# Patient Record
Sex: FEMALE | Race: WHITE | NOT HISPANIC OR LATINO | Employment: FULL TIME | URBAN - METROPOLITAN AREA
[De-identification: names, ages, dates, MRNs, and addresses within clinical notes are randomized per-mention and may not be internally consistent; named-entity substitution may affect disease eponyms.]

---

## 2017-01-11 ENCOUNTER — OFFICE VISIT (OUTPATIENT)
Dept: OBSTETRICS AND GYNECOLOGY | Facility: CLINIC | Age: 22
End: 2017-01-11

## 2017-01-11 VITALS
HEIGHT: 65 IN | WEIGHT: 250.8 LBS | DIASTOLIC BLOOD PRESSURE: 70 MMHG | HEART RATE: 70 BPM | SYSTOLIC BLOOD PRESSURE: 116 MMHG | BODY MASS INDEX: 41.79 KG/M2

## 2017-01-11 DIAGNOSIS — N97.0 INFERTILITY, ANOVULATION: Primary | ICD-10-CM

## 2017-01-11 PROCEDURE — 99213 OFFICE O/P EST LOW 20 MIN: CPT | Performed by: OBSTETRICS & GYNECOLOGY

## 2017-01-11 RX ORDER — ESTRADIOL 2 MG/1
TABLET ORAL
Qty: 24 TABLET | Refills: 0 | Status: SHIPPED | OUTPATIENT
Start: 2017-01-11 | End: 2017-01-11 | Stop reason: SDUPTHER

## 2017-01-11 RX ORDER — ESTRADIOL 2 MG/1
TABLET ORAL
Qty: 24 TABLET | Refills: 0 | Status: SHIPPED | OUTPATIENT
Start: 2017-01-11 | End: 2017-06-30

## 2017-01-11 RX ORDER — MEDROXYPROGESTERONE ACETATE 10 MG/1
TABLET ORAL
Qty: 14 TABLET | Refills: 0 | Status: SHIPPED | OUTPATIENT
Start: 2017-01-11 | End: 2017-01-11 | Stop reason: SDUPTHER

## 2017-01-11 RX ORDER — MEDROXYPROGESTERONE ACETATE 10 MG/1
TABLET ORAL
Qty: 14 TABLET | Refills: 0 | Status: SHIPPED | OUTPATIENT
Start: 2017-01-11 | End: 2017-03-14 | Stop reason: SDUPTHER

## 2017-01-11 NOTE — PROGRESS NOTES
Subjective   Maria Elena Jasso is a 21 y.o. female    CC: F/u for fertility    History of Present Illness  Pt would like to discuss what else she can do to try to conceive. Had a withdrawal flow from estrogen/progestin challenge a few months ago.  Now amenorrheic again.  Wants to begin the clomid that we had discussed before.    The following portions of the patient's history were reviewed and updated as appropriate: allergies, current medications, past family history, past medical history, past social history, past surgical history and problem list.    Review of Systems   Gastrointestinal: Negative for abdominal pain.   Genitourinary: Positive for menstrual problem. Negative for pelvic pain, vaginal bleeding and vaginal pain.       History reviewed. No pertinent past medical history.  History reviewed. No pertinent past surgical history.  OB History      Para Term  AB TAB SAB Ectopic Multiple Living    0 0 0 0 0 0 0 0 0 0        Menstrual History:  OB History      Para Term  AB TAB SAB Ectopic Multiple Living    0 0 0 0 0 0 0 0 0 0           Patient's last menstrual period was 2016.       Family History   Problem Relation Age of Onset   • Breast cancer Maternal Grandmother      History   Smoking Status   • Never Smoker   Smokeless Tobacco   • Not on file     History   Alcohol Use No     Comment: very rare       Objective   Physical Exam   Constitutional: She is oriented to person, place, and time. She appears well-developed and well-nourished.   Neurological: She is alert and oriented to person, place, and time.   Psychiatric: She has a normal mood and affect. Her behavior is normal. Judgment and thought content normal.   Nursing note and vitals reviewed.        Assessment/Plan   Diagnoses and all orders for this visit:    Infertility, anovulation : will re-induce withdrawal flow, then she will call and we'll begin a 50mg clomid cycle.  Discussed in detail and instruction for usage  given and written down by her.  Noted benefits/risks of usage.  Will resume PNV's now.    Other orders  -     Discontinue: estradiol (ESTRACE) 2 MG tablet; One daily X 24 days  -     Discontinue: medroxyPROGESTERone (PROVERA) 10 MG tablet; One daily with the last 14 days of her estradiol 2mg  -     estradiol (ESTRACE) 2 MG tablet; One daily X 24 days  -     medroxyPROGESTERone (PROVERA) 10 MG tablet; One daily with the last 14 days of her estradiol 2mg        Will call at onset of menses for Clomid rx.

## 2017-01-11 NOTE — MR AVS SNAPSHOT
Maria Elenabrie Jasso   2017 11:00 AM   Office Visit    Dept Phone:  [phone]   Encounter #:  [id number]    Provider:  Emmanuel Preston MD   Department:  [facility]                Your Full Care Plan              Today's Medication Changes          These changes are accurate as of: 17 11:03 AM.  If you have any questions, ask your nurse or doctor.               Stop taking medication(s)listed here:     estradiol 2 MG tablet   Commonly known as:  ESTRACE   Stopped by:  Emmanuel Preston MD           medroxyPROGESTERone 10 MG tablet   Commonly known as:  PROVERA   Stopped by:  Emmanuel Preston MD                      Your Updated Medication List      Notice  As of 2017 11:03 AM    You have not been prescribed any medications.            Instructions     None    Patient Instructions History      Upcoming Appointments     Visit Type Date Time Department    GYN FOLLOW UP 2017 11:00 AM [facility]      [employer] Signup     [facility] [employer] allows you to send messages to your doctor, view your test results, renew your prescriptions, schedule appointments, and more. To sign up, go to [employer] and click on the Sign Up Now link in the New User? box. Enter your [employer] Activation Code exactly as it appears below along with the last four digits of your Social Security Number and your Date of Birth () to complete the sign-up process. If you do not sign up before the expiration date, you must request a new code.    [employer] Activation Code: [email or web address]  Expires: 2017 11:03 AM    If you have questions, you can email [email or web address] or call [phone] to talk to our [employer] staff. Remember, [employer] is NOT to be used for urgent needs. For medical emergencies, dial 911.               Other Info from Your Visit           Allergies     No Known Allergies      Vital Signs     Blood Pressure Pulse  "Height Weight Last Menstrual Period Body Mass Index    116/70 (74 %/ 72 %)* 70 64.5\" (163.8 cm) (53 %, Z= 0.08)† 250 lb 12.8 oz (114 kg) (>99 %, Z= 2.51)† 11/17/2016 42.38 kg/m2 (99 %, Z= 2.23)†    Smoking Status                   Never Smoker         *BP percentiles are based on NHBPEP's 4th Report    †Growth percentiles are based on CDC 2-20 Years data.        "

## 2017-02-08 ENCOUNTER — TELEPHONE (OUTPATIENT)
Dept: OBSTETRICS AND GYNECOLOGY | Facility: CLINIC | Age: 22
End: 2017-02-08

## 2017-02-08 NOTE — TELEPHONE ENCOUNTER
----- Message from Cynthia Reyer sent at 2/7/2017  4:22 PM EST -----  Pt started her period today and is asking if you can call in the clomid to her pharmacy. RX # in chart, call back # [phone]    Sent in #5 of 50mg clomiphene to take one daily days 5-9 of cycle.

## 2017-02-13 ENCOUNTER — TELEPHONE (OUTPATIENT)
Dept: OBSTETRICS AND GYNECOLOGY | Facility: CLINIC | Age: 22
End: 2017-02-13

## 2017-02-13 NOTE — TELEPHONE ENCOUNTER
----- Message from Virginia Stein MA sent at 2/13/2017  1:29 PM EST -----  Pts insurance does not cover Clomid so she just paid cash for it. She did however have a question as to when she should start testing for ovulation.   ----- Message -----     From: Emmanuel Preston MD     Sent: 2/13/2017   8:38 AM       To: Virginia Stein MA    I never saw a request; have you?  ----- Message -----     From: Marcos Rouse     Sent: 2/10/2017   4:02 PM       To: Emmanuel Preston MD    Pt called about medication refill for clomid (which needs a PA).She stated the the pharmacy sent over a request for a PA and that she was told a PA was going to be requested. She would like to know if the PA has been sent in correctly because the insurance company has not received it. Pt is out of progesterone and estrogen  will need another rx for the medication.     Thank you-    MARCOS SOLARES to begin checking about day 14.  If she starts her flow on about day 28 or so, it means she ovulated, but just did not get pregnant that cycle and she needs to let us know.  If she does not start, wait about 2 wks and check preg test.  If pos, call and schedule OE visit.  If neg, she just did not ovulate with that dose; let us know, and we may need to give some med to get flow started and then may need to increase clomiphene dose.

## 2017-03-14 ENCOUNTER — TELEPHONE (OUTPATIENT)
Dept: OBSTETRICS AND GYNECOLOGY | Facility: CLINIC | Age: 22
End: 2017-03-14

## 2017-03-14 RX ORDER — MEDROXYPROGESTERONE ACETATE 5 MG/1
5 TABLET ORAL DAILY
Qty: 14 TABLET | Refills: 0 | Status: SHIPPED | OUTPATIENT
Start: 2017-03-14 | End: 2017-06-30

## 2017-03-14 NOTE — TELEPHONE ENCOUNTER
----- Message from Michelle Fletcher sent at 3/14/2017  1:32 PM EDT -----  Contact: pt  Patient called stating she did a round of Clomid this past month but this month she her pregnancy test is negative and she didn't start her cycle for this month. Please advise. Pt # is [phone] Pharmacy is in chart.    Sent her in #14 of 5mg provera to take one daily to start her flow.  Let us know when she begins and we'll increase our dose of Clomid for the next cycle.

## 2017-03-30 ENCOUNTER — TELEPHONE (OUTPATIENT)
Dept: OBSTETRICS AND GYNECOLOGY | Facility: CLINIC | Age: 22
End: 2017-03-30

## 2017-03-30 NOTE — TELEPHONE ENCOUNTER
----- Message from Cynthia Reyer sent at 3/28/2017  4:34 PM EDT -----  Pt is calling to let you know she started her period today so you can prescribe her more clomid. I won't be in the office tomorrow 3/29/17 so can you please forward the message to someone else? Thank you. Call back # [phone]    Called in.

## 2017-03-31 ENCOUNTER — TELEPHONE (OUTPATIENT)
Dept: OBSTETRICS AND GYNECOLOGY | Facility: CLINIC | Age: 22
End: 2017-03-31

## 2017-04-15 ENCOUNTER — OFFICE VISIT (OUTPATIENT)
Dept: RETAIL CLINIC | Facility: CLINIC | Age: 22
End: 2017-04-15

## 2017-04-15 DIAGNOSIS — Z02.83 ENCOUNTER FOR DRUG SCREENING: Primary | ICD-10-CM

## 2017-06-30 ENCOUNTER — OFFICE VISIT (OUTPATIENT)
Dept: OBSTETRICS AND GYNECOLOGY | Facility: CLINIC | Age: 22
End: 2017-06-30

## 2017-06-30 VITALS
SYSTOLIC BLOOD PRESSURE: 121 MMHG | BODY MASS INDEX: 40.98 KG/M2 | HEART RATE: 59 BPM | HEIGHT: 65 IN | WEIGHT: 246 LBS | DIASTOLIC BLOOD PRESSURE: 72 MMHG

## 2017-06-30 DIAGNOSIS — E66.01 MORBID OBESITY, UNSPECIFIED OBESITY TYPE (HCC): ICD-10-CM

## 2017-06-30 DIAGNOSIS — N97.0 INFERTILITY ASSOCIATED WITH ANOVULATION: ICD-10-CM

## 2017-06-30 DIAGNOSIS — N91.5 OLIGOMENORRHEA: ICD-10-CM

## 2017-06-30 DIAGNOSIS — Z71.3 ENCOUNTER FOR WEIGHT LOSS COUNSELING: ICD-10-CM

## 2017-06-30 DIAGNOSIS — Z01.419 VISIT FOR GYNECOLOGIC EXAMINATION: Primary | ICD-10-CM

## 2017-06-30 DIAGNOSIS — E28.2 PCOS (POLYCYSTIC OVARIAN SYNDROME): ICD-10-CM

## 2017-06-30 PROCEDURE — 99395 PREV VISIT EST AGE 18-39: CPT | Performed by: OBSTETRICS & GYNECOLOGY

## 2017-06-30 RX ORDER — MEDROXYPROGESTERONE ACETATE 10 MG/1
TABLET ORAL
Qty: 10 TABLET | Refills: 3 | Status: SHIPPED | OUTPATIENT
Start: 2017-06-30 | End: 2017-12-07

## 2017-06-30 NOTE — PROGRESS NOTES
"Subjective   Estela Jasso is a 22 y.o. female   CC: annual exam, abnormal periods    History of Present Illness  Patient is here for her annual exam today.  She also has complaints of infrequent menses.  Her last menstrual period was 4/30/17, and actually followed induction with progesterone and Clomid.  She previously had seen my partner, Dr. Delgado, who is now retiring.  He prescribed the patient Provera and Clomid to help with conception.  The patient does report that occasionally she did have a positive ovulation predictor kit on the Clomid.  She has not really seem to have menses without the progesterone or the Clomid.  The patient does admit to some mild acne and facial hirsutism.  She has had some recent weight gain.  She is currently sexually active with 1 partner.  They've been together for urinary habits.  She declines STD testing today.  She has never had a Pap smear before.  She denies any previous history of sexually transmitted diseases.  The patient does not routinely perform self breast exams.    The following portions of the patient's history were reviewed and updated as appropriate: allergies, current medications, past family history, past medical history, past social history, past surgical history and problem list.    Review of Systems  General: No fever or chills  Constitutional: No weight loss or gain, no hair loss  HENT: No headache, no hearing loss, no tinnitus  Eyes: normal vision, no eye pain  Lungs: No cough, no shortness of breath  Heart: No chest pain, no palpitations  Abdomen: No nausea, vomiting, constipation or diarrhea  : No dysuria, no hematuria  Skin: No rashes  Lymph: No swelling  Neuro: No parathesia, no weakness  Psych: Normal though content, no hallucinations, no SI/HI    Objective   Physical Exam  Vitals:    06/30/17 1024   BP: 121/72   Pulse: 59   Weight: 246 lb (112 kg)   Height: 64.5\" (163.8 cm)   Gen: No acute distress, awake and oriented times three  HENT: " Normocephalic, atraumatic, Moist mucous membranes, mild hirsutism noted  Eyes: PERRLA, EOMI  Neck: Supple, normal range of motion, no thyromegaly  Lungs: Normal work of breathing, lungs clear bilaterally, no crackles/wheezes  Heart: Regular rate and rhythm, no murmurs  Abdomen: soft, nontender, non distended, normoactive bowel sounds, hair on lower abdomen.  Breast: Symmetrical. No skin changes or nipple retractions. No lumps or masses bilaterally. No tenderness bilaterally.  Pelvic:   Normal external female genitalia, no lesions  Vagina: No blood or discharge  Cervix: No cervical motion tenderness, no lesions, no active bleeding, nonfriable  Uterus: Anteverted, normal size and shape, nontender  Adnexa: No masses or tenderness  Rectal: Deferred  Skin: Warm and dry, no rashes  Psych: Good judgement and insight, normal affect and mood  Neuro: CN 2-12 intact, no gross deficits    Assessment/Plan   Estela was seen today for gynecologic exam.    Diagnoses and all orders for this visit:    Visit for gynecologic examination    Infertility associated with anovulation    Oligomenorrhea  -     medroxyPROGESTERone (PROVERA) 10 MG tablet; Take 1 pill by mouth daily for 10 consecutive days each month    PCOS (polycystic ovarian syndrome)  -     medroxyPROGESTERone (PROVERA) 10 MG tablet; Take 1 pill by mouth daily for 10 consecutive days each month    Encounter for weight loss counseling    Morbid obesity, unspecified obesity type    Patient is not due for Pap smear yet.  She'll be due for Pap smears next year.  She declines STD testing.  We had a long conversation with the patient's history of infrequent menses.  I suspect polycystic ovarian syndrome.  We discussed the diagnosis of polycystic ovarian syndrome at length.  We discussed the role of obesity and weight in the pathophysiology of polycystic ovarian syndrome.  We discussed dietary and lifestyle modifications at length try to help her lose weight.  I recommended that  she try to get her body mass index at least down to 35.  I would not recommend further trials of Clomid at this time and the patient has lost some weight.  I would recommend starting the patient on cyclic Provera for 10 days each month to try to induce regular menses each month.  I gave the patient educational handouts on weight loss and polycystic ovarian syndrome.  She had lab work performed last year with Dr. Delgado, and this does not need to be repeated at this time.  I would like to see her back in 3 months to see how her menses are with the Provera and to check on her success with weight loss.

## 2017-12-05 ENCOUNTER — TELEPHONE (OUTPATIENT)
Dept: OBSTETRICS AND GYNECOLOGY | Facility: CLINIC | Age: 22
End: 2017-12-05

## 2017-12-05 DIAGNOSIS — N63.20 BREAST MASS, LEFT: Primary | ICD-10-CM

## 2017-12-05 NOTE — TELEPHONE ENCOUNTER
Yudelka from the [facility] for breast health called to request an order to be placed for the PT for have a left breast u/s for a left breast mass in the upper inner quadrant. The PT found the mass herself and scheduled her appt with them herself. The last name that they have on file for her is Chance.

## 2022-06-28 ENCOUNTER — OFFICE VISIT (OUTPATIENT)
Dept: OBSTETRICS AND GYNECOLOGY | Facility: CLINIC | Age: 27
End: 2022-06-28

## 2022-06-28 VITALS
DIASTOLIC BLOOD PRESSURE: 80 MMHG | HEIGHT: 65 IN | SYSTOLIC BLOOD PRESSURE: 115 MMHG | WEIGHT: 264 LBS | BODY MASS INDEX: 43.99 KG/M2

## 2022-06-28 DIAGNOSIS — Z01.419 VISIT FOR GYNECOLOGIC EXAMINATION: Primary | ICD-10-CM

## 2022-06-28 DIAGNOSIS — Z11.3 SCREENING FOR STDS (SEXUALLY TRANSMITTED DISEASES): ICD-10-CM

## 2022-06-28 PROCEDURE — 2014F MENTAL STATUS ASSESS: CPT | Performed by: OBSTETRICS & GYNECOLOGY

## 2022-06-28 PROCEDURE — 99385 PREV VISIT NEW AGE 18-39: CPT | Performed by: OBSTETRICS & GYNECOLOGY

## 2022-06-28 RX ORDER — COVID-19 MOLECULAR TEST ASSAY
KIT MISCELLANEOUS
COMMUNITY
Start: 2022-06-16

## 2022-06-29 LAB
HIV 1+2 AB+HIV1 P24 AG SERPL QL IA: NON REACTIVE
RPR SER QL: NON REACTIVE

## 2022-06-29 NOTE — PROGRESS NOTES
"[facility]  [address], [address]  [place], [place] [address]  Phone: [phone] / Fax:  [phone]      2022    [address] [place] [address] [address] [place] [address]    Provider, No Known    Chief Complaint   Patient presents with   • Gynecologic Exam     NP Annual Exam, last pap 8-8-18 NL. Patient with LMP of May 22nd. UCG in office today is Negative. Patient also notes to feeling like her ovaries are rolling when she is in a sitting position, mainly the Left side.       Estela Jasso is here for annual gynecologic exam.  HPI - Patient with last normal pap 4 years ago.  She reports regular cycles and declines hormonal contraception.  She requests STD testing.  Patient missed last cycle and pregnancy testing was negative.    Past Medical History:   Diagnosis Date   • COVID-19 2021       Past Surgical History:   Procedure Laterality Date   • WISDOM TOOTH EXTRACTION         No Known Allergies    Social History     Socioeconomic History   • Marital status: Single   Tobacco Use   • Smoking status: Former Smoker     Types: Cigarettes   Vaping Use   • Vaping Use: Never used   Substance and Sexual Activity   • Alcohol use: Yes     Comment: very rare   • Drug use: No   • Sexual activity: Yes     Partners: Male     Birth control/protection: None       Family History   Problem Relation Age of Onset   • Heart attack Father    • Breast cancer Maternal Grandmother    • Colon cancer Neg Hx        Patient's last menstrual period was 2022 (exact date).    OB History        0    Para   0    Term   0       0    AB   0    Living   0       SAB   0    IAB   0    Ectopic   0    Molar        Multiple   0    Live Births                    Vitals:    22 1505   BP: 115/80   Weight: 120 kg (264 lb)   Height: 165.1 cm (65\")       Physical Exam  Constitutional:       Appearance: Normal appearance. She is well-developed.   Genitourinary:      Bladder and urethral meatus normal.      " Right Labia: No tenderness or lesions.     Left Labia: No tenderness or lesions.     No vaginal discharge.        Right Adnexa: not tender and not full.     Left Adnexa: not tender and not full.     No cervical motion tenderness or lesion.      Uterus is not enlarged or tender.      No urethral tenderness or hypermobility present.   Breasts:      Right: No mass or nipple discharge.      Left: No mass or nipple discharge.       HENT:      Right Ear: External ear normal.      Left Ear: External ear normal.      Nose: Nose normal.   Eyes:      Conjunctiva/sclera: Conjunctivae normal.   Neck:      Thyroid: No thyromegaly.   Cardiovascular:      Rate and Rhythm: Normal rate and regular rhythm.      Heart sounds: Normal heart sounds.   Pulmonary:      Effort: Pulmonary effort is normal.      Breath sounds: No stridor. No wheezing.   Abdominal:      Palpations: Abdomen is soft. There is no mass.      Tenderness: There is no guarding or rebound.   Musculoskeletal:         General: Normal range of motion.      Cervical back: Normal range of motion and neck supple.   Neurological:      Mental Status: She is alert.      Coordination: Coordination normal.   Skin:     General: Skin is warm and dry.   Psychiatric:         Mood and Affect: Mood normal.         Behavior: Behavior normal.         Thought Content: Thought content normal.         Judgment: Judgment normal.   Vitals reviewed. Exam conducted with a chaperone present.         Diagnoses and all orders for this visit:    1. Visit for gynecologic examination (Primary)  -     IGP, Rfx Aptima HPV ASCU  -     Discussed importance of regular screening and breast awareness.  -     Discussed importance of prevention with vaccines, particularly Covid 19.    2. Screening for STDs (sexually transmitted diseases)  -     HIV-1 / O / 2 Ag / Antibody 4th Generation  -     RPR  -     Chlamydia trachomatis, Neisseria gonorrhoeae, Trichomonas vaginalis, PCR - Swab, Vagina        Hans  Guillermo Barone MD

## 2022-06-30 ENCOUNTER — TELEPHONE (OUTPATIENT)
Dept: OBSTETRICS AND GYNECOLOGY | Facility: CLINIC | Age: 27
End: 2022-06-30

## 2022-06-30 LAB
C TRACH RRNA SPEC QL NAA+PROBE: NEGATIVE
CONV .: NORMAL
CYTOLOGIST CVX/VAG CYTO: NORMAL
CYTOLOGY CVX/VAG DOC CYTO: NORMAL
CYTOLOGY CVX/VAG DOC THIN PREP: NORMAL
DX ICD CODE: NORMAL
HIV 1 & 2 AB SER-IMP: NORMAL
N GONORRHOEA RRNA SPEC QL NAA+PROBE: NEGATIVE
OTHER STN SPEC: NORMAL
STAT OF ADQ CVX/VAG CYTO-IMP: NORMAL
T VAGINALIS RRNA SPEC QL NAA+PROBE: NEGATIVE

## 2022-06-30 NOTE — TELEPHONE ENCOUNTER
Dalila    Let her know that her tests were normal, including pap and STD testing.    Thanks    Abisai

## 2022-06-30 NOTE — TELEPHONE ENCOUNTER
Spoke with Estela to let her know that Dr Barone said that your tests were normal, including pap and STD testing. Thank you.

## 2022-07-11 ENCOUNTER — PATIENT MESSAGE (OUTPATIENT)
Dept: OBSTETRICS AND GYNECOLOGY | Facility: CLINIC | Age: 27
End: 2022-07-11

## 2022-07-11 ENCOUNTER — PATIENT ROUNDING (BHMG ONLY) (OUTPATIENT)
Dept: OBSTETRICS AND GYNECOLOGY | Facility: CLINIC | Age: 27
End: 2022-07-11

## 2022-07-11 NOTE — PROGRESS NOTES
My chart message has been sent to the patient for PATIENT R[facility] with [facility].